# Patient Record
(demographics unavailable — no encounter records)

---

## 2025-03-18 NOTE — ASSESSMENT
[FreeTextEntry1] : 1. Morbid Obesity/BMI 43: Patient has a number of comorbid conditions related to his obesity. He desires weight loss. The drugs that are appropriate for him have already been tried and he does not tolerate them. We can revisit as new things come onto the market.  Counseled patient on Nutrition basics and Physical activity as below:  Nutrition Basics   To lose weight, you must be in a calorie deficit. There is no diet that negates the laws of thermodynamics. The best dietary pattern to follow for weight loss is the one you will stick to (provided some basic nutritional principles are met; see below).   Macronutrients: Protein, Lipids (fat), Carbohydrates ALL macronutrients are important!     Protein: Focus on lean proteins. With rare exception, most foods contain more than 1 macronutrient. One issue with protein, particularly from animal sources, is that there is usually fat associated with it, and some fats are healthier than others. High quality protein is low in fat, particularly saturated fat (see below). Lean proteins include: fish, skinless poultry, beans, tofu, seitan, low fat or fat free dairy Recommended protein intake is 0.8 g/kg body weight. Some categories of people (the elderly, pregnant women, and some say those on weight loss drugs or attempting large amounts of weight loss) should be more in the 1-1.2 g/kg body weight range. 1 gram of protein has approximately 4 calories. After strength training, it is best to ingest a protein containing meal ideally within 30 minutes, but definitely within 2 hours of training.   Lipids (fat): Focus on unsaturated fat rather and limit saturated fat Saturated fats primarily (but not exclusively) come from animal sources. Common sources of saturated fat: beef, pork, butter, cream, cheese, coconut oil, palm oil Many foods have a combination of saturated and unsaturated fats (nuts/seeds are a good example), but if they contain primarily unsaturated fats, we put them in the healthier category. Better to cook with olive oil or avocado oil than butter or coconut oil for example. When you see a low fat or fat free dairy product, the unhealthy saturated fat is what has been reduced. Use caution (especially with yogurt and salad dressing) to make sure the company did not add sugar to make up for lowering the fat. 1 gram of fat has approximately 9 calories. Be careful about portion sizes of even the healthy fats. The calories can add up.     Carbohydrates: Carbs are not evil! The main thing to focus on with carbohydrates is whether or not they are whole or refined. You may also have heard the terms simple and complex. When humans alter a food from its natural state, it is not usually to make it healthier. Your body processes a strawberry very differently than a strawberry fruit rollup. Fruit in its whole state is healthy. Yes, there are natural sugars in there, but they are attached to fiber and are digested differently than if the fiber is removed (juice). Potatoes are a complex carbohydrate. The reason they get a bad rap is because of how they are often prepared: drenched in butter or cheese, fried, ultraprocessed (chips). When we're talking about foods that contain flour it's important to read the ingredients label. You want the flour to say whole wheat or whole grain. You do not want to see the words refined or enriched. Refined means they removed all the nutritious parts of the grain. Enriched means they put back a few vitamins or other things. Whole grains are incredibly healthy. Even though we consider them a carbohydrate, many have a decent amount of protein. They are also rich in fiber. Examples of whole grains include: brown rice, quinoa, oats, barley, farro, amaranth It's important to read labels and specifically focus on added sugar. For example, milk has sugars listed on the label; lactose is a sugar, so of course it does. If you look at added sugars, however, that should say zero (and will if its not chocolate or other flavored milk). 1 gram of carbohydrate has approximately 4 calories.    Fiber: Comes from plants Most Americans are fiber deficient. Women should get 25-28 grams per day depending on which guideline you use. Men should get 35 grams per day. Low fiber diets are associated with increased heart disease, various cancers including colon, increased dementia, increased diabetes  the list is long. Fiber also fills up your stomach for fewer calories because most foods high in fiber are less calorie dense. Insoluble fiber is not digested and bulks up your stool. Soluble fiber is digested and helps lower LDL cholesterol and blood glucose levels.     Sodium: <2000 mg daily is a low sodium diet <1500 mg daily is a strictly low sodium diet You definitely should be <2500 mg daily if you have no health problems requiring you to restrict sodium. Sodium hides in a lot of packaged foods, sometimes in things that dont taste salty, so its important to read the label. Restaurant food is often high in sodium because  are taught to season liberally. Lowering sodium while simultaneously increasing foods that are high in potassium and magnesium works synergistically to lower blood pressure.  A Note About Physical Activity Physical Activity incorporates both Exercise and Non-exercise activity Exercise is formal and repetitive with the goal of increasing heart rate or strengthening muscle. Non-exercise activity includes daily movement/step count. BOTH are important. Guidelines for exercise are either 150 minutes of moderate cardio (walking at a pace where you can speak but not sing)  OR 75 minutes of vigorous cardio (out of breath  running, rope jumping, HIIT) Per week PLUS 2 days per week of strength involving upper and lower body as well as core. Stretching and balance is also recommended (yoga is a great option) The biggest benefit in health from exercise is seen in people going from doing nothing to doing something. SOMEthing  ANYthing  is better than nothing at all. If you only have 10 minutes one day, that 10 minutes is worth it.  I am very concerned about the carnivore diet in this patient with CAD and a stent. I explained the risks of a low fiber diet and a high saturated fat diet. I think the first goal we have to consider is getting more fiber into him. He is willing to try some new things, and multiple recipes were given. He agreed to see the RD, and I believe it likely will be covered because of his diabetes. Referral made  Patient is doing strength training 3-5 days per week. We need to work on adequate cardio.  Patient agreed to the following action plan: Physical Activity  Add core exercises - planks to start to your strength training  Keep up your strength training as you have been doing  Mondays, Wednesdays, Fridays in the mornings  Get on the treadmill to walk for 10-15 minutes  Goal pace on the treadmill: You can speak, but not sing - that's what is considered a moderate pace.   Nutrition Goal  MORE PLANTS!  Get at least 2 vegetables, 2 fruits or a fruit and a vegetable in per day  Make sure the fruit is a whole fruit, not altered in any way (like juice). Helpful to have fruit with a bit of protein like peanut butter (apple slices and peanut butter) to further slow the absorption  Check out recipes - Try at least 1 new recipe a week  Keep a food log - start with just writing WHAT you ate, not necessarily micromanaging how much at this point in time.   I'll make the referral to Aydee, the [facility] -- (nutritionist).  Labs and records requested from [facility] ([place]) and [facility] ([facility]).  2. HTN: Patient's bp is slightly high. He has a follow up scheduled soon with endocrine and it will be checked again there. Continue his lisinopril 5 mg for now, but he may need to increase. Sodium restriction encouraged.  2. Hyperlipidemia: Check outside labs. Continue rosuvastatin 40 mg daily for now.  4. Diabetes - type 1: Obtain records from endocrine ([facility]). Patient is on an insulin pump. He is not sure where his a1c is.   Total time spent reviewing records, seeing patient, generating action plan, documenting visit 70 minutes.

## 2025-03-18 NOTE — HISTORY OF PRESENT ILLNESS
[FreeTextEntry1] : 60 year old gentleman comes in today to establish care for weight management. His wife was a former PCP patient of mine. He has type 1 diabetes, hypertension, hyperlipidemia, hypothyroidism, and CAD. He has suffered from morbid obesity for many years. He has tried many different strategies for weight loss: WW Isogenix First Form most recently the Carnivore diet  He has not had medically meaningful weight loss that was sustainable. He tried both Ozempic and Mounjaro, but he could not tolerate either due to GERD and diarrhea. Metformin also did not help with weight loss at all.  Physical Activity: Patient goes to [employer] 3-5 times a week and does  minutes of strength training, upper and lower body. He does not do much core. He walks at home, has a treadmill, but due to knee pain he hasn't been doing that much recently. Gets a decent amount of NEAT  Nutrition 24 hour recall: BF: 6 poached eggs, "a bunch" of Latvian sausages Dinner: Ground beef, 1-2 slices of rye toast Snack: Tea and 1-2 slices of rye toast  Patient starts out by saying he really doesn't like vegetables -- he hasn't had them prepared in the most delicious of ways. He likes corn, roasted broccoli, +- stringbeans, +- carrots, raw spinach, cucumbers Never had swiss chard Likes beans, especially in chili Likes that cumin type flavor Fruit: Likes apples, clementines, oranges, berries Meat: Beef, venison, fish -- not too much chicken Whole grains: Not a fan of oats, likes brown rice, +- quinoa, never tried farro He is the main cook in the house. Shops at [employer], [employer], and [employer].

## 2025-03-18 NOTE — SOCIAL HISTORY
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 [place] the [place] [employer] because does double shifts